# Patient Record
Sex: FEMALE | Race: WHITE | NOT HISPANIC OR LATINO
[De-identification: names, ages, dates, MRNs, and addresses within clinical notes are randomized per-mention and may not be internally consistent; named-entity substitution may affect disease eponyms.]

---

## 2017-06-21 ENCOUNTER — APPOINTMENT (OUTPATIENT)
Dept: INTERNAL MEDICINE | Facility: CLINIC | Age: 52
End: 2017-06-21

## 2017-08-28 ENCOUNTER — APPOINTMENT (OUTPATIENT)
Dept: INTERNAL MEDICINE | Facility: CLINIC | Age: 52
End: 2017-08-28
Payer: COMMERCIAL

## 2017-08-28 VITALS
SYSTOLIC BLOOD PRESSURE: 130 MMHG | DIASTOLIC BLOOD PRESSURE: 70 MMHG | WEIGHT: 105 LBS | HEIGHT: 61 IN | TEMPERATURE: 97.5 F | BODY MASS INDEX: 19.83 KG/M2

## 2017-08-28 PROCEDURE — 86580 TB INTRADERMAL TEST: CPT

## 2017-08-28 PROCEDURE — 36415 COLL VENOUS BLD VENIPUNCTURE: CPT

## 2017-08-28 PROCEDURE — 93000 ELECTROCARDIOGRAM COMPLETE: CPT

## 2017-08-28 PROCEDURE — 99396 PREV VISIT EST AGE 40-64: CPT | Mod: 25

## 2017-08-30 ENCOUNTER — APPOINTMENT (OUTPATIENT)
Dept: INTERNAL MEDICINE | Facility: CLINIC | Age: 52
End: 2017-08-30
Payer: COMMERCIAL

## 2017-08-30 VITALS
DIASTOLIC BLOOD PRESSURE: 68 MMHG | TEMPERATURE: 97.5 F | WEIGHT: 105 LBS | SYSTOLIC BLOOD PRESSURE: 110 MMHG | BODY MASS INDEX: 19.83 KG/M2 | HEIGHT: 61 IN

## 2017-08-30 DIAGNOSIS — Z11.1 ENCOUNTER FOR SCREENING FOR RESPIRATORY TUBERCULOSIS: ICD-10-CM

## 2017-08-30 LAB
ALBUMIN SERPL ELPH-MCNC: 5.1 G/DL
ALP BLD-CCNC: 80 U/L
ALT SERPL-CCNC: 33 U/L
ANION GAP SERPL CALC-SCNC: 13 MMOL/L
APPEARANCE: CLEAR
AST SERPL-CCNC: 26 U/L
BASOPHILS # BLD AUTO: 0.07 K/UL
BASOPHILS NFR BLD AUTO: 0.8 %
BILIRUB SERPL-MCNC: 0.6 MG/DL
BILIRUBIN URINE: NEGATIVE
BLOOD URINE: NEGATIVE
BUN SERPL-MCNC: 16 MG/DL
CALCIUM SERPL-MCNC: 9.9 MG/DL
CHLORIDE SERPL-SCNC: 102 MMOL/L
CHOLEST SERPL-MCNC: 253 MG/DL
CHOLEST/HDLC SERPL: 2.9 RATIO
CO2 SERPL-SCNC: 25 MMOL/L
COLOR: YELLOW
CREAT SERPL-MCNC: 0.9 MG/DL
EOSINOPHIL # BLD AUTO: 0.51 K/UL
EOSINOPHIL NFR BLD AUTO: 5.9 %
ERYTHROCYTE [SEDIMENTATION RATE] IN BLOOD BY WESTERGREN METHOD: 2 MM/HR
GLUCOSE QUALITATIVE U: NORMAL MG/DL
GLUCOSE SERPL-MCNC: 108 MG/DL
HCT VFR BLD CALC: 43.4 %
HDLC SERPL-MCNC: 87 MG/DL
HGB BLD-MCNC: 14.1 G/DL
IMM GRANULOCYTES NFR BLD AUTO: 0.1 %
KETONES URINE: NEGATIVE
LDLC SERPL CALC-MCNC: 149 MG/DL
LEUKOCYTE ESTERASE URINE: NEGATIVE
LYMPHOCYTES # BLD AUTO: 3.45 K/UL
LYMPHOCYTES NFR BLD AUTO: 40 %
MAN DIFF?: NORMAL
MCHC RBC-ENTMCNC: 29.7 PG
MCHC RBC-ENTMCNC: 32.5 GM/DL
MCV RBC AUTO: 91.4 FL
MONOCYTES # BLD AUTO: 0.41 K/UL
MONOCYTES NFR BLD AUTO: 4.8 %
NEUTROPHILS # BLD AUTO: 4.17 K/UL
NEUTROPHILS NFR BLD AUTO: 48.4 %
NITRITE URINE: NEGATIVE
PH URINE: 6.5
PLATELET # BLD AUTO: 419 K/UL
POTASSIUM SERPL-SCNC: 4.6 MMOL/L
PROT SERPL-MCNC: 7.6 G/DL
PROTEIN URINE: NEGATIVE MG/DL
RBC # BLD: 4.75 M/UL
RBC # FLD: 12.4 %
SAVE SPECIMEN: NORMAL
SODIUM SERPL-SCNC: 140 MMOL/L
SPECIFIC GRAVITY URINE: 1
THYROGLOB AB SERPL-ACNC: <20 IU/ML
THYROPEROXIDASE AB SERPL IA-ACNC: <10 IU/ML
TRIGL SERPL-MCNC: 84 MG/DL
TSH SERPL-ACNC: 2.32 UIU/ML
UROBILINOGEN URINE: NORMAL MG/DL
WBC # FLD AUTO: 8.62 K/UL

## 2017-08-30 PROCEDURE — 99213 OFFICE O/P EST LOW 20 MIN: CPT

## 2018-08-22 ENCOUNTER — LABORATORY RESULT (OUTPATIENT)
Age: 53
End: 2018-08-22

## 2018-08-22 ENCOUNTER — APPOINTMENT (OUTPATIENT)
Dept: INTERNAL MEDICINE | Facility: CLINIC | Age: 53
End: 2018-08-22
Payer: COMMERCIAL

## 2018-08-22 VITALS
SYSTOLIC BLOOD PRESSURE: 115 MMHG | OXYGEN SATURATION: 97 % | HEIGHT: 61 IN | HEART RATE: 66 BPM | TEMPERATURE: 98.3 F | WEIGHT: 110 LBS | DIASTOLIC BLOOD PRESSURE: 70 MMHG | BODY MASS INDEX: 20.77 KG/M2

## 2018-08-22 DIAGNOSIS — Z80.0 FAMILY HISTORY OF MALIGNANT NEOPLASM OF DIGESTIVE ORGANS: ICD-10-CM

## 2018-08-22 DIAGNOSIS — E78.00 PURE HYPERCHOLESTEROLEMIA, UNSPECIFIED: ICD-10-CM

## 2018-08-22 DIAGNOSIS — Z82.5 FAMILY HISTORY OF ASTHMA AND OTHER CHRONIC LOWER RESPIRATORY DISEASES: ICD-10-CM

## 2018-08-22 DIAGNOSIS — Z80.43 FAMILY HISTORY OF MALIGNANT NEOPLASM OF TESTIS: ICD-10-CM

## 2018-08-22 DIAGNOSIS — M54.9 DORSALGIA, UNSPECIFIED: ICD-10-CM

## 2018-08-22 PROCEDURE — 36415 COLL VENOUS BLD VENIPUNCTURE: CPT

## 2018-08-22 PROCEDURE — 99386 PREV VISIT NEW AGE 40-64: CPT | Mod: 25

## 2018-08-24 ENCOUNTER — RESULT REVIEW (OUTPATIENT)
Age: 53
End: 2018-08-24

## 2018-08-24 DIAGNOSIS — R79.89 OTHER SPECIFIED ABNORMAL FINDINGS OF BLOOD CHEMISTRY: ICD-10-CM

## 2018-08-24 LAB
ALBUMIN SERPL ELPH-MCNC: 5.3 G/DL
ALP BLD-CCNC: 93 U/L
ALT SERPL-CCNC: 32 U/L
ANION GAP SERPL CALC-SCNC: 15 MMOL/L
APPEARANCE: CLEAR
AST SERPL-CCNC: 30 U/L
BASOPHILS # BLD AUTO: 0.08 K/UL
BASOPHILS NFR BLD AUTO: 0.8 %
BILIRUB SERPL-MCNC: 0.7 MG/DL
BILIRUBIN URINE: NEGATIVE
BLOOD URINE: NEGATIVE
BUN SERPL-MCNC: 14 MG/DL
CALCIUM SERPL-MCNC: 10.3 MG/DL
CHLORIDE SERPL-SCNC: 103 MMOL/L
CHOLEST SERPL-MCNC: 237 MG/DL
CHOLEST/HDLC SERPL: 2.7 RATIO
CO2 SERPL-SCNC: 26 MMOL/L
COLOR: YELLOW
CREAT SERPL-MCNC: 0.9 MG/DL
EOSINOPHIL # BLD AUTO: 0.52 K/UL
EOSINOPHIL NFR BLD AUTO: 5.1 %
GLUCOSE QUALITATIVE U: NEGATIVE MG/DL
GLUCOSE SERPL-MCNC: 93 MG/DL
HBA1C MFR BLD HPLC: 5.7 %
HCT VFR BLD CALC: 45.8 %
HDLC SERPL-MCNC: 88 MG/DL
HGB BLD-MCNC: 14.9 G/DL
IMM GRANULOCYTES NFR BLD AUTO: 0.1 %
KETONES URINE: NEGATIVE
LDLC SERPL CALC-MCNC: 127 MG/DL
LEUKOCYTE ESTERASE URINE: NEGATIVE
LYMPHOCYTES # BLD AUTO: 4.1 K/UL
LYMPHOCYTES NFR BLD AUTO: 40.1 %
MAN DIFF?: NORMAL
MCHC RBC-ENTMCNC: 30.6 PG
MCHC RBC-ENTMCNC: 32.5 GM/DL
MCV RBC AUTO: 94 FL
MONOCYTES # BLD AUTO: 0.63 K/UL
MONOCYTES NFR BLD AUTO: 6.2 %
NEUTROPHILS # BLD AUTO: 4.88 K/UL
NEUTROPHILS NFR BLD AUTO: 47.7 %
NITRITE URINE: NEGATIVE
PH URINE: 6.5
PLATELET # BLD AUTO: 424 K/UL
POTASSIUM SERPL-SCNC: 5.1 MMOL/L
PROT SERPL-MCNC: 7.3 G/DL
PROTEIN URINE: NEGATIVE MG/DL
RBC # BLD: 4.87 M/UL
RBC # FLD: 12.7 %
SODIUM SERPL-SCNC: 144 MMOL/L
SPECIFIC GRAVITY URINE: 1.01
TRIGL SERPL-MCNC: 108 MG/DL
TSH SERPL-ACNC: 2.31 UIU/ML
UROBILINOGEN URINE: NEGATIVE MG/DL
WBC # FLD AUTO: 10.22 K/UL

## 2018-08-26 PROBLEM — Z80.0 FAMILY HISTORY OF COLON CANCER: Status: ACTIVE | Noted: 2018-08-26

## 2018-08-26 PROBLEM — Z80.43 FAMILY HISTORY OF MALIGNANT NEOPLASM OF TESTIS: Status: ACTIVE | Noted: 2018-08-26

## 2018-08-26 PROBLEM — Z82.5 FAMILY HISTORY OF ASTHMA: Status: ACTIVE | Noted: 2018-08-26

## 2018-08-26 NOTE — PHYSICAL EXAM

## 2018-08-26 NOTE — HEALTH RISK ASSESSMENT
[Patient reported mammogram was normal] : Patient reported mammogram was normal [Patient reported PAP Smear was normal] : Patient reported PAP Smear was normal [MammogramDate] : 02/16 [PapSmearDate] : 06/18

## 2018-08-26 NOTE — HISTORY OF PRESENT ILLNESS
[FreeTextEntry1] : Establish care [de-identified] : \par Was following with Dr. Guzman. Now switched offices.\par \par No acute complaints.\par \par When wears bra, has pain in the back, right where the bra strap would be. Spoke to GYN and advised to wear sports bras or no bra at all. When pain first started, went for mammogram which was fine. Denies any rash in the area. Has been going on for a while, probably 1 year. Works in a  and camp. No recollection of back injury.\par \par Has some scaly patches on scalp, but otherwise no joint pains or swelling.\par \par Also with some lower back pain. \par \par Follows with dermatologist.\par \par Has known mitral valve prolapse. Had an echo done yearly. \par \par Had kidney infection >20 years. No UTIs.\par \par GYN: Had 2 C-sections for failure to dilate and position. No GDM or HTN. \par \par Fell and broke R wrist - needed 2 surgery. \par \par Healthy diet including fruits/vegetables.\par BMs normal - no constipation. Normal urination.

## 2019-09-30 ENCOUNTER — EMERGENCY (EMERGENCY)
Facility: HOSPITAL | Age: 54
LOS: 1 days | Discharge: HOME | End: 2019-09-30
Attending: EMERGENCY MEDICINE
Payer: COMMERCIAL

## 2019-09-30 VITALS
HEIGHT: 61 IN | TEMPERATURE: 99 F | WEIGHT: 117.95 LBS | RESPIRATION RATE: 18 BRPM | HEART RATE: 69 BPM | SYSTOLIC BLOOD PRESSURE: 135 MMHG | OXYGEN SATURATION: 99 % | DIASTOLIC BLOOD PRESSURE: 77 MMHG

## 2019-09-30 PROCEDURE — 12001 RPR S/N/AX/GEN/TRNK 2.5CM/<: CPT

## 2019-09-30 PROCEDURE — 12001 RPR S/N/AX/GEN/TRNK 2.5CM/<: CPT | Mod: FA

## 2019-09-30 PROCEDURE — 99283 EMERGENCY DEPT VISIT LOW MDM: CPT | Mod: 25

## 2019-09-30 PROCEDURE — 90471 IMMUNIZATION ADMIN: CPT

## 2019-09-30 PROCEDURE — 73140 X-RAY EXAM OF FINGER(S): CPT

## 2019-09-30 PROCEDURE — 90715 TDAP VACCINE 7 YRS/> IM: CPT

## 2019-09-30 PROCEDURE — 73140 X-RAY EXAM OF FINGER(S): CPT | Mod: 26,LT

## 2019-09-30 RX ORDER — TETANUS TOXOID, REDUCED DIPHTHERIA TOXOID AND ACELLULAR PERTUSSIS VACCINE, ADSORBED 5; 2.5; 8; 8; 2.5 [IU]/.5ML; [IU]/.5ML; UG/.5ML; UG/.5ML; UG/.5ML
0.5 SUSPENSION INTRAMUSCULAR ONCE
Refills: 0 | Status: COMPLETED | OUTPATIENT
Start: 2019-09-30 | End: 2019-09-30

## 2019-09-30 RX ORDER — ACETAMINOPHEN 500 MG
650 TABLET ORAL ONCE
Refills: 0 | Status: COMPLETED | OUTPATIENT
Start: 2019-09-30 | End: 2019-09-30

## 2019-09-30 RX ADMIN — TETANUS TOXOID, REDUCED DIPHTHERIA TOXOID AND ACELLULAR PERTUSSIS VACCINE, ADSORBED 0.5 MILLILITER(S): 5; 2.5; 8; 8; 2.5 SUSPENSION INTRAMUSCULAR at 17:23

## 2019-09-30 RX ADMIN — Medication 650 MILLIGRAM(S): at 17:23

## 2019-09-30 NOTE — ED PROVIDER NOTE - PHYSICAL EXAMINATION
PHYSICAL EXAM:  GENERAL: non-toxic appearing; in no respiratory distress  HEAD: Atraumatic, Normocephalic;  EYES: PERRL, EOMs intact b/l w/out deficits  CHEST/LUNG: CTAB no wheezes/rhonchi/rales  HEART: RRR no murmur/gallops/rubs  ABDOMEN: +BS, soft, NT, ND  MUSCULOSKELETAL: FROM of all 4 extremities;   NERVOUS SYSTEM:  A&Ox3; sensation and motor strength intact  SKIN: approximately 2 cm laceration to palmar aspect of distal thumb. no bone

## 2019-09-30 NOTE — ED PROVIDER NOTE - PATIENT PORTAL LINK FT
You can access the FollowMyHealth Patient Portal offered by [facility] by registering at the following website: [email or web address]. By joining [facility]’s FollowMyHealth portal, you will also be able to view your health information using other applications (apps) compatible with our system.

## 2019-09-30 NOTE — ED PROVIDER NOTE - OBJECTIVE STATEMENT
53 yo F no medical hx presents for left thumb laceration while cutting fruit with a sharp knife. pt went to urgent care and there was concern due to possible bone or nail involveemnt, so pt subsequently sent to ED for further eval. no numbness/tingling. unknown last tetanus.

## 2019-09-30 NOTE — ED PROVIDER NOTE - NSFOLLOWUPINSTRUCTIONS_ED_ALL_ED_FT
Please keep the area clean and dry for 24 hours. You may wash the area with soap and water but do not scrub the area, let the water run off. Apply Bacitracin ointment twice a day.  Please have your stitches out in 7 days. You may go to your primary care physician, urgent care, Please keep the area clean and dry for 24 hours. You may wash the area with soap and water but do not scrub the area, let the water run off. Apply Bacitracin ointment twice a day.  Please have your stitches out in 7 days. You may go to your primary care physician, urgent care, or the emergency department.  You may take Tylenol and Motrin as needed for pain.  Please return for worsening symptoms, including signs of infection, such as worsening pain, swelling, foul odor, purulent discharge, and/or fever.

## 2019-09-30 NOTE — ED PROVIDER NOTE - PROGRESS NOTE DETAILS
wound irrigated copiously w/ water. repaired with 5-0 nylon stitches after local anesthesia. wound care instructions given. return precautions given. all questions answered, pt to be d/c

## 2019-09-30 NOTE — ED PROVIDER NOTE - NS ED ROS FT
Constitutional: no fevers or chills  HEENT: no visual changes, no sore throat, no rhinorrhea  CV: no cp or palpitations  Resp: no sob or cough  GI: no abd pain, no nausea, no vomiting  MSK: thumb pain;   skin: thumb laceration; no rashes  neuro: no HA, no numbness; no weakness, no tingling  ROS statement: all other ROS negative except as per HPI

## 2019-09-30 NOTE — ED PROVIDER NOTE - CLINICAL SUMMARY MEDICAL DECISION MAKING FREE TEXT BOX
LETI Sol MD: Pt is a 53 y/o female without sig PMH who presents for left thumb laceration while cutting fruit with a sharp knife. Pt went to urgent care and there was concern due to possible bone or nail involvement, so pt was subsequently sent to ED for further eval. Last tetanus shot unknown. +Paresthesias to distal L thumb, however, sensation and motor intact. Plan: XR to r/o fx, suture laceration, update tetanus Adeel Tirado PGY1 54 F no medical hx presents for left thumb lac after accidentally cutting herself with a kitchen knife. unknown last tetanus. went to urgent care, sent here due to concern for possible bone/nail involvement. will obtain xray, provide tetanus, analgesia, repair laceration, and plan to d/c home    LETI Sol MD: Pt is a 53 y/o female without sig PMH who presents for left thumb laceration while cutting fruit with a sharp knife. Pt went to urgent care and there was concern due to possible bone or nail involvement, so pt was subsequently sent to ED for further eval. Last tetanus shot unknown. +Paresthesias to distal L thumb, however, sensation and motor intact. Plan: XR to r/o fx, suture laceration, update tetanus

## 2019-10-06 ENCOUNTER — EMERGENCY (EMERGENCY)
Facility: HOSPITAL | Age: 54
LOS: 1 days | Discharge: HOME | End: 2019-10-06
Attending: EMERGENCY MEDICINE

## 2019-10-06 VITALS
TEMPERATURE: 98 F | HEART RATE: 79 BPM | HEIGHT: 61 IN | WEIGHT: 117.07 LBS | SYSTOLIC BLOOD PRESSURE: 119 MMHG | OXYGEN SATURATION: 99 % | DIASTOLIC BLOOD PRESSURE: 78 MMHG | RESPIRATION RATE: 16 BRPM

## 2019-10-06 NOTE — ED PROVIDER NOTE - PHYSICAL EXAMINATION
GENERAL: Middle aged female, lying in bed, NAD. Vital signs are within normal limits   CV: 2+ radial pulses b/l  MSK: No edema, no visible deformities. 5/5 strength and full range of motion of all extremities  NEURO: Intact sensation to hands  SKIN: Warm, dry, well perfused, no evidence of rash. Laceration on left distal thumb has 5 sutures, closed, healing well. Clean, dry intact. GENERAL: Middle aged female, lying in bed, NAD. Vital signs are within normal limits   CV: 2+ radial pulses b/l  MSK: No edema, no visible deformities. 5/5 strength and full range of motion of all extremities  NEURO: Intact sensation to hands  SKIN: Warm, dry, well perfused, no evidence of rash. Laceration on left distal thumb has 5 sutures, closed, healing well. Clean, dry intact.    Attn - alert, nad, "V" shaped laceration tip of left thumb - healing well.  no erythema.  5 sutures removed.

## 2019-10-06 NOTE — ED PROVIDER NOTE - NSFOLLOWUPINSTRUCTIONS_ED_ALL_ED_FT
-You were seen in the Emergency Department (ED) for SUTURE REMOVAL. Lab and imaging results, if performed, were discussed with you along with your discharge diagnosis.    MEDICATIONS:  -Continue all other prescribed medicine, IF ANY, as per your primary care doctor's (PMD) recommendations.    PAIN CONTROL:  -Please take over the counter Tylenol (also known as acetaminophen) 650mg every 6 hours or Ibuprofen (also known as motrin, advil) 600mg every 8 hour for your pain, IF ANY, unless you are not supposed to for any reason.  -Rest, stay hydrated with plenty of fluids (drink at least 2 Liters or 64 Ounces of water each day UNLESS you are supposed to restrict fluids or ANY reason.    RETURN PRECAUTIONS:  -Please return to the Emergency Department if you experience ANY new or concerning symptoms, such as, but not limited to: worsening pain, large amount of bleeding, passing out, fever >100.F, shaking chills, inability to see or new double vision, chest pain, difficulty breathing, diffuse abdominal pain, unable to eat or drink, continuous vomiting or diarrhea, unable to move or feel part of your body      Thank you for choosing a [facility] ED.

## 2019-10-06 NOTE — ED PROVIDER NOTE - NS ED ROS FT
CONSTITUTIONAL: No fevers, chills, fatigue  SKIN: No rashes, swelling  MSK: No muscle aches, joint aches  NEURO: no numbness, tingling

## 2019-10-06 NOTE — ED PROVIDER NOTE - CLINICAL SUMMARY MEDICAL DECISION MAKING FREE TEXT BOX
54 y.o. female presents to the ED for suture removal of left distal thumb. Will remove. 54 y.o. female presents to the ED for suture removal of left distal thumb. Will remove.  Attn - 5 sutures removed tip of left thumb

## 2019-10-06 NOTE — ED PROVIDER NOTE - PATIENT PORTAL LINK FT
You can access the FollowMyHealth Patient Portal offered by [facility] by registering at the following website: [email or web address]. By joining [facility]’s FollowMyHealth portal, you will also be able to view your health information using other applications (apps) compatible with our system.

## 2019-10-06 NOTE — ED ADULT TRIAGE NOTE - HEART RATE (BEATS/MIN)
How Severe Are Your Spot(S)?: mild
What Is The Reason For Today's Visit?: Full Body Skin Examination
What Is The Reason For Today's Visit? (Being Monitored For X): concerning skin lesions on an annual basis
79

## 2019-10-06 NOTE — ED PROVIDER NOTE - OBJECTIVE STATEMENT
54 y.o. female presents to the ED for suture removal. No current complaints. 54 y.o. female presents to the ED for suture removal. No current complaints. Laceration occurred 1 week ago while patient was preparing food with a knife. Denies any discharge, fever, chills, swelling. 54 y.o. female presents to the ED for suture removal. No current complaints. Laceration occurred 1 week ago while patient was preparing food with a knife. Denies any discharge, fever, chills, swelling.  Attn - pt seen in FT3 - agree with above - s/p lacertation left thumb for s/r.   healing well.

## 2021-04-12 PROBLEM — Z78.9 OTHER SPECIFIED HEALTH STATUS: Chronic | Status: ACTIVE | Noted: 2019-10-06

## 2021-05-14 ENCOUNTER — NON-APPOINTMENT (OUTPATIENT)
Age: 56
End: 2021-05-14

## 2021-05-14 ENCOUNTER — APPOINTMENT (OUTPATIENT)
Dept: FAMILY MEDICINE | Facility: CLINIC | Age: 56
End: 2021-05-14
Payer: COMMERCIAL

## 2021-05-14 VITALS
WEIGHT: 125 LBS | SYSTOLIC BLOOD PRESSURE: 118 MMHG | HEART RATE: 72 BPM | DIASTOLIC BLOOD PRESSURE: 68 MMHG | HEIGHT: 61 IN | BODY MASS INDEX: 23.6 KG/M2 | OXYGEN SATURATION: 98 % | TEMPERATURE: 98.1 F | RESPIRATION RATE: 18 BRPM

## 2021-05-14 DIAGNOSIS — Z00.00 ENCOUNTER FOR GENERAL ADULT MEDICAL EXAMINATION W/OUT ABNORMAL FINDINGS: ICD-10-CM

## 2021-05-14 DIAGNOSIS — Z12.11 ENCOUNTER FOR SCREENING FOR MALIGNANT NEOPLASM OF COLON: ICD-10-CM

## 2021-05-14 DIAGNOSIS — Z12.39 ENCOUNTER FOR OTHER SCREENING FOR MALIGNANT NEOPLASM OF BREAST: ICD-10-CM

## 2021-05-14 DIAGNOSIS — Z78.0 ASYMPTOMATIC MENOPAUSAL STATE: ICD-10-CM

## 2021-05-14 DIAGNOSIS — Z13.820 ENCOUNTER FOR SCREENING FOR OSTEOPOROSIS: ICD-10-CM

## 2021-05-14 DIAGNOSIS — Z80.42 FAMILY HISTORY OF MALIGNANT NEOPLASM OF PROSTATE: ICD-10-CM

## 2021-05-14 DIAGNOSIS — Z92.29 PERSONAL HISTORY OF OTHER DRUG THERAPY: ICD-10-CM

## 2021-05-14 DIAGNOSIS — I34.1 NONRHEUMATIC MITRAL (VALVE) PROLAPSE: ICD-10-CM

## 2021-05-14 DIAGNOSIS — Z82.49 FAMILY HISTORY OF ISCHEMIC HEART DISEASE AND OTHER DISEASES OF THE CIRCULATORY SYSTEM: ICD-10-CM

## 2021-05-14 DIAGNOSIS — Z83.3 FAMILY HISTORY OF DIABETES MELLITUS: ICD-10-CM

## 2021-05-14 PROCEDURE — 93000 ELECTROCARDIOGRAM COMPLETE: CPT | Mod: 59

## 2021-05-14 PROCEDURE — 99396 PREV VISIT EST AGE 40-64: CPT | Mod: 25

## 2021-05-14 PROCEDURE — G0444 DEPRESSION SCREEN ANNUAL: CPT

## 2021-05-14 PROCEDURE — G0442 ANNUAL ALCOHOL SCREEN 15 MIN: CPT

## 2021-05-14 PROCEDURE — 99072 ADDL SUPL MATRL&STAF TM PHE: CPT

## 2021-05-17 PROBLEM — Z12.39 SCREENING FOR BREAST CANCER: Status: ACTIVE | Noted: 2021-05-14

## 2021-05-17 PROBLEM — Z12.11 SCREENING FOR COLON CANCER: Status: ACTIVE | Noted: 2021-05-14

## 2021-05-17 PROBLEM — Z78.0 POSTMENOPAUSE: Status: ACTIVE | Noted: 2017-08-28

## 2021-05-17 PROBLEM — Z13.820 SCREENING FOR OSTEOPOROSIS: Status: ACTIVE | Noted: 2021-05-14

## 2021-05-17 PROBLEM — Z92.29 COVID-19 VACCINE SERIES COMPLETED: Status: ACTIVE | Noted: 2021-05-17

## 2021-05-17 PROBLEM — Z00.00 ENCOUNTER FOR PREVENTIVE HEALTH EXAMINATION: Status: ACTIVE | Noted: 2021-05-14

## 2021-05-17 NOTE — HEALTH RISK ASSESSMENT
[Very Good] : ~his/her~  mood as very good [No] : In the past 12 months have you used drugs other than those required for medical reasons? No [No falls in past year] : Patient reported no falls in the past year [0] : 2) Feeling down, depressed, or hopeless: Not at all (0) [Patient reported mammogram was normal] : Patient reported mammogram was normal [Patient reported PAP Smear was normal] : Patient reported PAP Smear was normal [With Family] : lives with family [# of Members in Household ___] :  household currently consist of [unfilled] member(s) [Employed] : employed [Single] : single [# Of Children ___] : has [unfilled] children [Feels Safe at Home] : Feels safe at home [Fully functional (bathing, dressing, toileting, transferring, walking, feeding)] : Fully functional (bathing, dressing, toileting, transferring, walking, feeding) [Fully functional (using the telephone, shopping, preparing meals, housekeeping, doing laundry, using] : Fully functional and needs no help or supervision to perform IADLs (using the telephone, shopping, preparing meals, housekeeping, doing laundry, using transportation, managing medications and managing finances) [Reports normal functional visual acuity (ie: able to read med bottle)] : Reports normal functional visual acuity [] : No [Audit-CScore] : 0 [de-identified] : very active [de-identified] : getting healthier [[id number]] : 0 [Sexually Active] : not sexually active [High Risk Behavior] : no high risk behavior [Reports changes in hearing] : Reports no changes in hearing [Reports changes in vision] : Reports no changes in vision [MammogramDate] : 2016 [PapSmearDate] : 2018 [BoneDensityComments] : referral given  [ColonoscopyComments] : referral given [FreeTextEntry2] : , "backyard camping adventure"

## 2021-05-17 NOTE — END OF VISIT
[FreeTextEntry3] : ,discuss\par \par \par I personally discussed this patient with  the Nurse Practitioner at the time of the visit.  I agree with the findings and plan as documented in the Nurse Practitioner's note, unless noted below.\par \par

## 2021-05-17 NOTE — HISTORY OF PRESENT ILLNESS
[FreeTextEntry1] : 54 yo female with PMHx MVP presents to the office to establish care and for a physical. [de-identified] : The patient reports feeling well, reports a history of mitral valve prolapse, does not recall her last echocardiogram. She has never had a colonoscopy, or a bone density screening.

## 2021-06-15 ENCOUNTER — NON-APPOINTMENT (OUTPATIENT)
Age: 56
End: 2021-06-15

## 2021-06-15 DIAGNOSIS — R79.89 OTHER SPECIFIED ABNORMAL FINDINGS OF BLOOD CHEMISTRY: ICD-10-CM

## 2021-06-15 DIAGNOSIS — R73.03 PREDIABETES.: ICD-10-CM

## 2021-08-05 ENCOUNTER — APPOINTMENT (OUTPATIENT)
Dept: RADIOLOGY | Facility: CLINIC | Age: 56
End: 2021-08-05
Payer: COMMERCIAL

## 2021-08-05 ENCOUNTER — RESULT REVIEW (OUTPATIENT)
Age: 56
End: 2021-08-05

## 2021-08-05 ENCOUNTER — APPOINTMENT (OUTPATIENT)
Dept: MAMMOGRAPHY | Facility: CLINIC | Age: 56
End: 2021-08-05
Payer: COMMERCIAL

## 2021-08-05 PROCEDURE — 77080 DXA BONE DENSITY AXIAL: CPT

## 2021-08-05 PROCEDURE — 77063 BREAST TOMOSYNTHESIS BI: CPT

## 2021-08-05 PROCEDURE — 77067 SCR MAMMO BI INCL CAD: CPT

## 2021-08-27 ENCOUNTER — NON-APPOINTMENT (OUTPATIENT)
Age: 56
End: 2021-08-27

## 2021-08-30 ENCOUNTER — NON-APPOINTMENT (OUTPATIENT)
Age: 56
End: 2021-08-30

## 2022-01-04 ENCOUNTER — RX RENEWAL (OUTPATIENT)
Age: 57
End: 2022-01-04

## 2022-01-13 ENCOUNTER — NON-APPOINTMENT (OUTPATIENT)
Age: 57
End: 2022-01-13

## 2022-01-14 ENCOUNTER — NON-APPOINTMENT (OUTPATIENT)
Age: 57
End: 2022-01-14

## 2022-01-15 ENCOUNTER — APPOINTMENT (OUTPATIENT)
Dept: RHEUMATOLOGY | Facility: CLINIC | Age: 57
End: 2022-01-15
Payer: COMMERCIAL

## 2022-01-15 VITALS
BODY MASS INDEX: 22.66 KG/M2 | TEMPERATURE: 97.9 F | DIASTOLIC BLOOD PRESSURE: 83 MMHG | OXYGEN SATURATION: 97 % | SYSTOLIC BLOOD PRESSURE: 151 MMHG | HEIGHT: 61 IN | WEIGHT: 120 LBS | HEART RATE: 40 BPM

## 2022-01-15 DIAGNOSIS — M81.0 AGE-RELATED OSTEOPOROSIS W/OUT CURRENT PATHOLOGICAL FRACTURE: ICD-10-CM

## 2022-01-15 PROCEDURE — 99205 OFFICE O/P NEW HI 60 MIN: CPT

## 2022-08-15 ENCOUNTER — RX RENEWAL (OUTPATIENT)
Age: 57
End: 2022-08-15

## 2022-08-15 RX ORDER — ATORVASTATIN CALCIUM 10 MG/1
10 TABLET, FILM COATED ORAL
Qty: 90 | Refills: 1 | Status: ACTIVE | COMMUNITY
Start: 2021-06-15 | End: 1900-01-01

## 2022-10-24 ENCOUNTER — NON-APPOINTMENT (OUTPATIENT)
Age: 57
End: 2022-10-24

## 2022-11-14 ENCOUNTER — APPOINTMENT (OUTPATIENT)
Dept: FAMILY MEDICINE | Facility: CLINIC | Age: 57
End: 2022-11-14

## 2022-11-14 ENCOUNTER — NON-APPOINTMENT (OUTPATIENT)
Age: 57
End: 2022-11-14

## 2022-11-14 VITALS
HEART RATE: 118 BPM | RESPIRATION RATE: 18 BRPM | OXYGEN SATURATION: 98 % | DIASTOLIC BLOOD PRESSURE: 80 MMHG | TEMPERATURE: 98 F | SYSTOLIC BLOOD PRESSURE: 138 MMHG

## 2022-11-14 VITALS — HEART RATE: 80 BPM

## 2022-11-14 DIAGNOSIS — J20.9 ACUTE BRONCHITIS, UNSPECIFIED: ICD-10-CM

## 2022-11-14 DIAGNOSIS — R05.9 COUGH, UNSPECIFIED: ICD-10-CM

## 2022-11-14 PROCEDURE — 99214 OFFICE O/P EST MOD 30 MIN: CPT

## 2022-11-14 RX ORDER — PROMETHAZINE HYDROCHLORIDE AND DEXTROMETHORPHAN HYDROBROMIDE ORAL SOLUTION 15; 6.25 MG/5ML; MG/5ML
6.25-15 SOLUTION ORAL
Qty: 1 | Refills: 0 | Status: ACTIVE | COMMUNITY
Start: 2022-11-14 | End: 1900-01-01

## 2022-11-14 RX ORDER — ALBUTEROL SULFATE 90 UG/1
108 (90 BASE) INHALANT RESPIRATORY (INHALATION)
Qty: 1 | Refills: 1 | Status: ACTIVE | COMMUNITY
Start: 2022-11-14 | End: 1900-01-01

## 2022-11-14 RX ORDER — DOXYCYCLINE 100 MG/1
100 CAPSULE ORAL
Qty: 14 | Refills: 0 | Status: COMPLETED | COMMUNITY
Start: 2022-10-25

## 2022-11-14 RX ORDER — AZITHROMYCIN 250 MG/1
250 TABLET, FILM COATED ORAL
Qty: 1 | Refills: 0 | Status: ACTIVE | COMMUNITY
Start: 2022-11-14 | End: 1900-01-01

## 2022-11-20 PROBLEM — R05.9 COUGH: Status: ACTIVE | Noted: 2022-11-14

## 2022-11-20 PROBLEM — J20.9 ACUTE BRONCHITIS WITH BRONCHOSPASM: Status: RESOLVED | Noted: 2022-11-20 | Resolved: 2022-12-20

## 2022-11-20 NOTE — ASSESSMENT
[FreeTextEntry1] : VSS \par medication as prescribed, medication education done \par advised to increase fluid intake, rest, and acetaminophen/ibuprofen prn pain/fever\par follow up in office if sx persists or worsens\par patient verbalizes understanding and is stable upon d/c\par

## 2022-11-20 NOTE — HISTORY OF PRESENT ILLNESS
[FreeTextEntry8] : cough x 3-4 weeks \par low grade fever and headache \par tested for covid 19 on saturday \par went to urgent care on 10/25\par given doxycycline 7 days \par the week later \par cough started again\par productive \par worse in the morning \par denies any sinus pressure\par denies any chest tightness or sob \par worsens with talking\par appetite low \par on own taking advil prn \par is not currently taking a cough medicine, initally took delsylm \par denies nay home sick contacts but is a \par denies any recent travel \par former smoker \par denies any other associated symptoms\par denies any other complaints or concerns at this time

## 2022-11-20 NOTE — PHYSICAL EXAM
[Normal] : no posterior cervical lymphadenopathy and no anterior cervical lymphadenopathy [de-identified] : + transmitted upper airway sounds

## 2022-11-29 LAB — SARS-COV-2 N GENE NPH QL NAA+PROBE: NOT DETECTED

## 2025-03-11 NOTE — ED PROVIDER NOTE - CROS ED ROS STATEMENT
mobility to enable patient to return to overhead activities.   [x] Progressing: [] Met: [] Not Met: [] Adjusted  Patient will return to raising her arm overhead  without increased symptoms or restriction.   [] Progressing: [] Met: [] Not Met: [] Adjusted  \" I want to be able to do my work around the house   [] Progressing: [] Met: [] Not Met: [] Adjusted         Overall Progression Towards Functional goals/ Treatment Progress Update:  [x] Patient is progressing as expected towards functional goals listed.    [] Progression is slowed due to complexities/Impairments listed.  [] Progression has been slowed due to co-morbidities.  [] Plan just implemented, too soon (<30days) to assess goals progression   [] Goals require adjustment due to lack of progress  [] Patient is not progressing as expected and requires additional follow up with physician  [] Other:     TREATMENT PLAN     Frequency/Duration: 2-3x/week for  24  weeks for the following treatment interventions:    Interventions:  Therapeutic Exercise (98682) including: strength training, ROM, and functional mobility  Therapeutic Activities (80604) including: functional mobility training and education.  Neuromuscular Re-education (93143) activation and proprioception, including postural re-education.    Manual Therapy (75289) as indicated to include: Passive Range of Motion, Gr I-IV mobilizations, Soft Tissue Mobilization, Trigger Point Release, and Myofascial Release  Modalities as needed that may include: Vasoneumatic Compression  Patient education on joint protection, postural re-education, activity modification, and progression of HEP    Plan: POC initiated as per evaluationUE arom, aarom, prom, strengthening, scapular strength, myofascial release, joint mobs to gh, sc, ac, scapular thoracic, modalities for pain, hep.  Follow Dr. Bran's protocol  2/4/25  Progress with protocol. Try gentle sl scap mobs  2/11/25  Cont with protocol, give sefl er stretch next  all other ROS negative except as per HPI